# Patient Record
Sex: MALE | Race: WHITE
[De-identification: names, ages, dates, MRNs, and addresses within clinical notes are randomized per-mention and may not be internally consistent; named-entity substitution may affect disease eponyms.]

---
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
## 2020-04-27 ENCOUNTER — HOSPITAL ENCOUNTER (EMERGENCY)
Dept: HOSPITAL 76 - ED | Age: 20
Discharge: HOME | End: 2020-04-27
Payer: COMMERCIAL

## 2020-04-27 VITALS — DIASTOLIC BLOOD PRESSURE: 90 MMHG | SYSTOLIC BLOOD PRESSURE: 146 MMHG

## 2020-04-27 DIAGNOSIS — B95.8: ICD-10-CM

## 2020-04-27 DIAGNOSIS — L08.9: Primary | ICD-10-CM

## 2020-04-27 PROCEDURE — 99282 EMERGENCY DEPT VISIT SF MDM: CPT

## 2020-04-27 PROCEDURE — 99284 EMERGENCY DEPT VISIT MOD MDM: CPT

## 2020-04-27 NOTE — ED PHYSICIAN DOCUMENTATION
PD HPI SKIN





- Stated complaint


Stated Complaint: BUMP ON BACK





- Chief complaint


Chief Complaint: General





- History obtained from


History obtained from: Patient





- History of Present Illness


Timing - onset: Yesterday


Timing - duration: Days


Timing - details: Gradual onset, Still present


Location: Back


Quality / character: Swelling, Draining


Associated symptoms: No: Fever, Myalgias, Joint pain, Headache, Facial swelling,

Dyspnea, Abd pain, N/V/D, Urinary sx


Similar symptoms before: Has not had sx before


Recently seen: Not recently seen





- Additional information


Additional information: 





Previously well 19-year-old male states that he reached down to feel something 

at the bottom of his spine he felt a little lump there pulled his hand out and 

there was some blood and drainage on it.  He is coming today with a persistence 

of the small bump on his lower spine he is concerned about the possibility of a 

spinal cyst.He has had not had these symptoms previously and he does not have a 

history of pilonidal cyst.





Review of Systems


Constitutional: denies: Fever, Chills, Myalgias


Eyes: denies: Decreased vision


Ears: denies: Ear pain


Nose: denies: Rhinorrhea / runny nose, Congestion


Throat: denies: Sore throat


Cardiac: denies: Chest pain / pressure, Palpitations


Respiratory: denies: Dyspnea, Cough


GI: denies: Abdominal Pain, Nausea, Vomiting


: denies: Dysuria





PD PAST MEDICAL HISTORY





- Past Medical History


Cardiovascular: None


Respiratory: None


Endocrine/Autoimmune: None


GI: None


: None


HEENT: None


Psych: None


Musculoskeletal: None


Derm: None





- Past Surgical History


Past Surgical History: No





- Present Medications


Home Medications: 


                                Ambulatory Orders











 Medication  Instructions  Recorded  Confirmed


 


Ondansetron Odt [Zofran] 4 mg TL Q6H PRN #10 tablet 09/16/18 


 


Mupirocin 1 gm TP BID #22 gm 04/27/20 


 


Sulfamethoxazole/Trimethoprim 1 each PO BID #14 tablet 04/27/20 





[Sulfamethoxazole-Tmp Ds Tablet]   














- Allergies


Allergies/Adverse Reactions: 


                                    Allergies











Allergy/AdvReac Type Severity Reaction Status Date / Time


 


aspirin AdvReac  Unknown Verified 04/27/20 13:20














- Social History


Does the pt smoke?: No


Smoking Status: Never smoker


Does the pt drink ETOH?: No


Does the pt have substance abuse?: No





- Immunizations


Immunizations are current?: Yes





PD ED PE NORMAL





- Vitals


Vital signs reviewed: Yes (Hypertensive)





- General


General: Alert and oriented X 3, No acute distress, Well developed/nourished





- HEENT


HEENT: Atraumatic, PERRL, EOMI, Other (The patient does have acne vulgaris)





- Respiratory


Respiratory: No respiratory distress





- Back


Back: No CVA TTP, No spinal TTP, Other (At the tail end of the spineA 1 cm round

raised the coccyx there is papule that has been unroofed and is draining.  There

is no deeper underlying mass or fluctuance and this does not appear to be a 

pilonidal cyst this appears to be a staph infection superficial in the skin.)





- Derm


Derm: Normal color, Warm and dry, No rash





- Extremities


Extremities: No deformity, No edema, No calf tenderness / cord





- Neuro


Neuro: Alert and oriented X 3, CNs 2-12 intact, No motor deficit, No sensory 

deficit, Normal speech


Eye Opening: Spontaneous


Motor: Obeys Commands


Verbal: Oriented


GCS Score: 15





- Psych


Psych: Normal mood, Normal affect





Results





- Vitals


Vitals: 





                               Vital Signs - 24 hr











  04/27/20





  13:20


 


Temperature 36.8 C


 


Heart Rate 71


 


Respiratory 18





Rate 


 


Blood Pressure 146/90 H


 


O2 Saturation 99








                                     Oxygen











O2 Source                      Room air

















PD MEDICAL DECISION MAKING





- ED course


Complexity details: considered differential, d/w patient


ED course: 





19-year-old male with a staph infection on his back that is now unroofed we will

place him on some mupirocin Septra.





Departure





- Departure


Disposition: 01 Home, Self Care


Clinical Impression: 


 Staphylococcal infection of skin





Condition: Stable


Instructions:  ED Staph Infec Abx Tx Only


Follow-Up: 


[facility] [Provider Group]


Prescriptions: 


Mupirocin 1 gm TP BID #22 gm


Sulfamethoxazole/Trimethoprim [Sulfamethoxazole-Tmp Ds Tablet] 1 each PO BID #14

tablet

## 2021-01-16 ENCOUNTER — HOSPITAL ENCOUNTER (EMERGENCY)
Dept: HOSPITAL 76 - ED | Age: 21
Discharge: HOME | End: 2021-01-16
Payer: MEDICAID

## 2021-01-16 VITALS — SYSTOLIC BLOOD PRESSURE: 121 MMHG | DIASTOLIC BLOOD PRESSURE: 64 MMHG

## 2021-01-16 DIAGNOSIS — F17.200: ICD-10-CM

## 2021-01-16 DIAGNOSIS — Z20.822: ICD-10-CM

## 2021-01-16 DIAGNOSIS — J20.6: Primary | ICD-10-CM

## 2021-01-16 LAB — C PNEUM DNA NPH QL NAA+NON-PROBE: NOT DETECTED

## 2021-01-16 PROCEDURE — 71045 X-RAY EXAM CHEST 1 VIEW: CPT

## 2021-01-16 PROCEDURE — 94664 DEMO&/EVAL PT USE INHALER: CPT

## 2021-01-16 PROCEDURE — 99283 EMERGENCY DEPT VISIT LOW MDM: CPT

## 2021-01-16 PROCEDURE — 0202U NFCT DS 22 TRGT SARS-COV-2: CPT

## 2021-01-16 PROCEDURE — 94640 AIRWAY INHALATION TREATMENT: CPT

## 2021-01-16 PROCEDURE — 99284 EMERGENCY DEPT VISIT MOD MDM: CPT

## 2021-01-16 NOTE — XRAY REPORT
PROCEDURE:  Chest 1 View X-Ray

 

INDICATIONS:  Shortness of breath

 

TECHNIQUE:  One view of the chest was acquired.  

 

COMPARISON:  None

 

FINDINGS:  

 

Surgical changes and devices:  None.  

 

Lungs and pleura:  No pleural effusions or pneumothorax.  Lungs are clear.  

 

Mediastinum:  Mediastinal contours appear normal.  Heart size is normal.  

 

Bones and chest wall:  No suspicious bony lesions.  Overlying soft tissues appear unremarkable.  

 

 

 

IMPRESSION:  

 

Normal portable chest.

 

 

Note: No significant discrepancy from the preliminary report.

 

Reviewed by: Jewel Rosado MD on 1/16/2021 7:19 AM [facility]

Approved by: Jewel Rosado MD on 1/16/2021 7:19 AM [facility]

 

 

Station ID:  [id number]

## 2021-01-16 NOTE — ED PHYSICIAN DOCUMENTATION
PD HPI URI





- Stated complaint


Stated Complaint: SOA





- Chief complaint


Chief Complaint: Resp





- History obtained from


History obtained from: Patient





- History of Present Illness


Timing - onset: Yesterday


Timing duration: Days (1)


Timing details: Gradual onset, Still present in ED


Associated symptoms: Fever, Nasal congestion, Rhinorrhea, Dry cough, Dyspnea


Contributing factors: Sick contact (just stared working at [employer])


Worsened by: Breathing


Similar symptoms before: Diagnosis (URI with asthma)


Recently seen: Not recently seen





- Additional information


Additional information: 





Previously well 20-year-old male has developed a cough and congestion over the 

past day and he has now developed a fever.  He is just already working at 

[employer] prior to this he has been quarantined at home.  He has a prior history

of asthma and he feels that the shortness of breath he is having now is 

different than what is had previously with asthma and he has not had an asthma 

attack for about 4 years.





Review of Systems


Constitutional: reports: Fever, Chills


Eyes: denies: Decreased vision


Ears: denies: Ear pain


Nose: reports: Rhinorrhea / runny nose, Congestion


Throat: denies: Sore throat


Cardiac: denies: Chest pain / pressure, Palpitations


Respiratory: reports: Dyspnea, Cough, Wheezing


GI: denies: Abdominal Pain, Nausea, Vomiting


: denies: Dysuria, Frequency





PD PAST MEDICAL HISTORY





- Past Medical History


Past Medical History: Yes


Cardiovascular: None


Respiratory: Asthma


Endocrine/Autoimmune: None


GI: None


: None


HEENT: None


Psych: None


Musculoskeletal: None


Derm: None





- Past Surgical History


Past Surgical History: No





- Present Medications


Home Medications: 


                                Ambulatory Orders











 Medication  Instructions  Recorded  Confirmed


 


Ondansetron Odt [Zofran] 4 mg TL Q6H PRN #10 tablet 09/16/18 


 


Mupirocin 1 gm TP BID #22 gm 04/27/20 


 


Sulfamethoxazole/Trimethoprim 1 each PO BID #14 tablet 04/27/20 





[Sulfamethoxazole-Tmp Ds Tablet]   


 


Albuterol Sulf [Ventolin Hfa 1 - 2 puffs INH Q4HR PRN #1 inhaler 01/16/21 





Inhaler]   


 


Amox/Clav 875/125 [Augmentin] 1 each PO Q12H #20 tablet 01/16/21 














- Allergies


Allergies/Adverse Reactions: 


                                    Allergies











Allergy/AdvReac Type Severity Reaction Status Date / Time


 


aspirin AdvReac  Unknown Verified 01/16/21 02:11














- Social History


Does the pt smoke?: Yes


Smoking Status: Current every day smoker


Does the pt drink ETOH?: No


Does the pt have substance abuse?: No





- Immunizations


Immunizations are current?: Yes





PD ED PE NORMAL





- Vitals


Vital signs reviewed: Yes (febrile and tachycardic)





- General


General: Alert and oriented X 3, No acute distress, Well developed/nourished





- HEENT


HEENT: Atraumatic, PERRL, EOMI, Pharynx benign, Other (mild erythema to the left

 TM. )





- Neck


Neck: Supple, no meningeal sign, No bony TTP





- Cardiac


Cardiac: RRR, No murmur





- Respiratory


Respiratory: No respiratory distress, Other (scattered wheezes with fair air 

movement)





- Abdomen


Abdomen: Soft, Non tender





- Back


Back: No CVA TTP, No spinal TTP





- Derm


Derm: Normal color, Warm and dry, No rash





- Extremities


Extremities: No deformity, No edema





- Neuro


Neuro: Alert and oriented X 3, CNs 2-12 intact, No motor deficit, No sensory 

deficit, Normal speech


Eye Opening: Spontaneous


Motor: Obeys Commands


Verbal: Oriented


GCS Score: 15





- Psych


Psych: Normal mood, Normal affect





Results





- Vitals


Vitals: 


                               Vital Signs - 24 hr











  01/16/21 01/16/21 01/16/21





  02:05 02:39 04:08


 


Temperature 38.1 C H 38.1 C H 


 


Heart Rate 105 H 105 H 107 H


 


Respiratory 20 20 





Rate   


 


Blood Pressure 118/76 118/76 106/57 L


 


O2 Saturation 95 96 94














  01/16/21





  04:40


 


Temperature 


 


Heart Rate 99


 


Respiratory 18





Rate 


 


Blood Pressure 


 


O2 Saturation 








                                     Oxygen











O2 Source                      Room air

















- Labs


Labs: 


                                Laboratory Tests











  01/16/21





  02:57


 


Nasal Adenovirus (PCR)  NOT DETECTED


 


Nasal B. parapertussis DNA (PCR)  NOT DETECTED


 


Nasal Coronavir 229E PCR  NOT DETECTED


 


Nasal Coronavir HKU1 PCR  NOT DETECTED


 


Nasal Coronavir NL63 PCR  NOT DETECTED


 


Nasal Coronavir OC43 PCR  NOT DETECTED


 


Nasal Enterovir/Rhinovir PCR  DETECTED A


 


Nasal Influenza B PCR  NOT DETECTED


 


Nasal Influenza A PCR  NOT DETECTED


 


Nasal Parainfluen 1 PCR  NOT DETECTED


 


Nasal Parainfluen 2 PCR  NOT DETECTED


 


Nasal Parainfluen 3 PCR  NOT DETECTED


 


Nasal Parainfluen 4 PCR  NOT DETECTED


 


Nasal RSV (PCR)  NOT DETECTED


 


Nasal B.pertussis DNA PCR  NOT DETECTED


 


Nasal C.pneumoniae (PCR)  NOT DETECTED


 


Hilario Human Metapneumo PCR  NOT DETECTED


 


Nasal M.pneumoniae (PCR)  NOT DETECTED


 


Nasal SARS-CoV-2 (PCR)  NOT DETECTED














- Rads (name of study)


  ** chest


Radiology: Prelim report reviewed, EMP read indepedently, See rad report 

(Impression: no consolidation)





PD MEDICAL DECISION MAKING





- ED course


Complexity details: reviewed results, re-evaluated patient, considered 

differential, d/w patient


ED course: 





20-year-old male with acute shortness of breath and nasal congestion cough and 

fever, has mild wheezing on exam, has right otitis on exam and a PCR with 

evidence of rhinovirus. He is treated in the ED with decadron, albuterol and 

augmentin. 





Departure





- Departure


Disposition: 01 Home, Self Care


Clinical Impression: 


 Acute bronchitis due to Rhinovirus





Otitis media


Qualifiers:


 Otitis media type: suppurative Chronicity: acute Laterality: right Recurrence: 

not specified as recurrent Spontaneous tympanic membrane rupture: without 

spontaneous rupture Qualified Code(s): H66.001 - Acute suppurative otitis media 

without spontaneous rupture of ear drum, right ear





Condition: Stable


Instructions:  Cold Virus, ED Bronchitis Asthmatic, ED Otitis Media Acute Adult


Follow-Up: 


[facility] [Provider Group]


Prescriptions: 


Albuterol Sulf [Ventolin Hfa Inhaler] 1 - 2 puffs INH Q4HR PRN #1 inhaler


 PRN Reason: Shortness Of Air/Wheezing


Amox/Clav 875/125 [Augmentin] 1 each PO Q12H #20 tablet


Forms:  Activity restrictions